# Patient Record
Sex: MALE | Race: WHITE | Employment: FULL TIME | URBAN - METROPOLITAN AREA
[De-identification: names, ages, dates, MRNs, and addresses within clinical notes are randomized per-mention and may not be internally consistent; named-entity substitution may affect disease eponyms.]

---

## 2024-05-21 ENCOUNTER — TELEPHONE (OUTPATIENT)
Dept: TRANSPLANT | Facility: HOSPITAL | Age: 39
End: 2024-05-21

## 2024-05-21 NOTE — TELEPHONE ENCOUNTER
Referral reviewed. Spoke with pt. He notes he does not have paralysis and that was entered in error. We discussed remote donation and what that entailed.  He is not sure if he wants to proceed with  for workup or do remote donation. He states he needs to talk to his job and his wife further about this. Discussed closing Referral for time being and pt will call back to let us know on how he wants to proceed.